# Patient Record
(demographics unavailable — no encounter records)

---

## 2025-04-01 NOTE — REASON FOR VISIT
[Home] : at home, [unfilled] , at the time of the visit. [Medical Office: ([facility])___] : at the medical office located in  [Telephone (audio)] : This telephonic visit was provided via audio only technology. [Patient preference] : patient preference. [Verbal consent obtained from patient] : the patient, [unfilled] [Follow-Up: _____] : a [unfilled] follow-up visit

## 2025-04-01 NOTE — HISTORY OF PRESENT ILLNESS
[FreeTextEntry1] : JUAN PETERS is a 65 year old F, nonsmoker, with PMHx of hypertension, hyperlipidemia, seasonal allergies, type 2 diabetes, who is referred by Dr. Toshia Dunlap for evaluation of the right lower lobe pulmonary nodule, which was found on a subsequent CT chest s/p an abdominal CT angiogram for AAA screening.  CTA abdomen on 02/24/24 showed trace b/l pleural effusion and 0.4cm left lung base nodule. Subsequential CT chest 06/22/24 revealed resolution of left lung nodule. Instead, a subpleural right lower lobe nodule 1cm and two left upper lobe faint ground-glass focal opacities are noted. There are also persistent trace b/l pleural effusions on imaging. Inflammation/infectious process was in favor. Patient was put under observation.  One of her BROOK GG nodule had resolved based on CT scan in Sep 50624.   She presents today with a follow-up CT scan. Patient reports feeling well in general. No unintentional weight loss, headache, anorexia, fatigue, persist cough, chest pain, dyspnea, or hemoptysis.  CT chest on 03/27/2025 - stable 1.1cm right lower lobe nodule dating back to 06/2024.  - stable GGNs in the left lung.  - stable trace bilateral pleural effusions.

## 2025-04-01 NOTE — ASSESSMENT
[FreeTextEntry1] : JUAN PETERS is a 65 year old F, nonsmoker, with PMHx of hypertension, hyperlipidemia, seasonal allergies, type 2 diabetes, who is referred by Dr. Toshia Dunlap for evaluation of the right lower lobe pulmonary nodule, which was found on a subsequent CT chest s/p an abdominal CT angiogram for AAA screening.  CTA abdomen on 02/24/24 showed trace b/l pleural effusion and 0.4cm left lung base nodule. Subsequential CT chest 06/22/24 revealed resolution of left lung nodule. Instead, a subpleural right lower lobe nodule 1cm and two left upper lobe faint ground-glass focal opacities are noted. There are also persistent trace b/l pleural effusions on imaging. Inflammation/infectious process was in favor.  CT chest completed on 03/27/2025 was reviewed and discussed with the patient, the right lower lobe nodule has been stable since 06/2024. The GGN in left lung are stable as well.  Continued CT follow-up in 6 months is recommended. Patient understood and agreed with the plan.   Plan: CT chest in 6 months.

## 2025-04-01 NOTE — DATA REVIEWED
[FreeTextEntry1] : CT chest on 09/19/2024 - Stable 1.1 cm pleural based mass at the posterior right lower lobe (image 56/series 2). - Ill-defined 1 cm pleural based ground-glass opacity in the lateral left upper lobe is unchanged (image 39/series 2). The second ground-glass opacity which was seen previously has resolved. - Trace bilateral pleural effusions.